# Patient Record
Sex: FEMALE | Race: WHITE
[De-identification: names, ages, dates, MRNs, and addresses within clinical notes are randomized per-mention and may not be internally consistent; named-entity substitution may affect disease eponyms.]

---

## 2018-06-22 ENCOUNTER — HOSPITAL ENCOUNTER (EMERGENCY)
Dept: HOSPITAL 68 - ERH | Age: 38
End: 2018-06-22
Payer: COMMERCIAL

## 2018-06-22 VITALS — DIASTOLIC BLOOD PRESSURE: 76 MMHG | SYSTOLIC BLOOD PRESSURE: 117 MMHG

## 2018-06-22 DIAGNOSIS — Z48.00: Primary | ICD-10-CM

## 2018-06-22 NOTE — ED ANIMAL BITE/WOUND CHECK
History of Present Illness
 
General
Chief Complaint: Suture Removal/Wound Recheck
Stated Complaint: WOUND CHECK
Source: patient
Exam Limitations: no limitations
 
Vital Signs & Intake/Output
Vital Signs & Intake/Output
 Vital Signs
 
 
Date Time Temp Pulse Resp B/P B/P Pulse O2 O2 Flow FiO2
 
     Mean Ox Delivery Rate 
 
06/22 1044 97.9 70  117/76  98   
 
 
 
Allergies
Coded Allergies:
Sulfa (Sulfonamide Antibiotics) (RASH/HIVES 10/01/16)
acetaminophen (From THERAFLU COLD-SORE THROAT (PE)) (CONVULSIONS AS A KID 10/01/
16)
clindamycin (RASH/HIVES 10/01/16)
doxycycline (RASH/HIVES 10/01/16)
hornet venom (ANAPHYLAXIS 10/01/16)
pheniramine (From THERAFLU COLD-SORE THROAT (PE)) (CONVULSIONS AS A KID 10/01/16
)
phenylephrine (From THERAFLU COLD-SORE THROAT (PE)) (CONVULSIONS AS A KID 10/01/
16)
spider venom (RASH, EYES SWELL 10/01/16)
venom-honey bee (BEE VENOM (HONEY BEE)) (ANAPHYLAXIS 10/01/16)
venom-wasp (WASP VENOM) (ANAPHYLAXIS 10/01/16)
 
Reconcile Medications
Alprazolam 1 MG TABLET   1 TAB PO BID PRN ANXIETY  (Reported)
Amoxicillin/Potassium Clav (Augmentin 875-125 Tablet) 875 MG-125 MG TABLET   1 
TAB PO BID cat bite
Baclofen 20 MG TABLET   1 TAB PO TID PRN PAIN 
Ciprofloxacin HCl (Cipro) 500 MG TABLET   1 TAB PO BID UTI
Cyclobenzaprine HCl 10 MG TABLET   1 TAB PO QPM PRN MUSCLE SPASMS  (Reported)
Epinephrine (Epipen 2-Jace) 0.3 MG/0.3 ML AUTO.INJCT   0.3 MG IM AD PRN ALLERGIC 
REACTION  (Reported)
Hydromorphone HCl (Dilaudid) 2 MG TABLET   1 TAB PO BIDP PRN pain
Hydromorphone HCl (Dilaudid) 2 MG TABLET   1 TAB PO Q6P PRN PAIN
Hydromorphone HCl (Dilaudid) 2 MG TABLET   1 TAB PO BIDP PRN PAIN
Lisinopril 10 MG TABLET   1 TAB PO DAILY BP  (Reported)
Medroxyprogesterone Acetate (Depo-Provera) 150 MG/1 ML SYRINGE   1 ML IM Q3M 
BIRTH CONTROL  (Reported)
Meloxicam (Mobic) 15 MG TABLET   1 TAB PO DAILY PRN PAIN
Ondansetron (Zofran Odt) 4 MG TAB.RAPDIS   1 TAB SL TID PRN NAUSEA 
Ondansetron (Zofran Odt) 4 MG TAB.RAPDIS   1 TAB SL TID PRN NAUSEA
Oxycodone HCl/Acetaminophen (Percocet 5-325 MG Tablet) 5 MG-325 MG TABLET   1 
TAB PO BID pain
Phenazopyridine HCl (Pyridium) 100 MG TABLET   1 TAB PO TID PRN DYSURIA
     NOTE THAT YOUR URINE MAY CHANGE THE COLOR ORANGE
 
Triage Note:
PER PT SEEN 2 DAYS AGO AFTER KNOWN CAT BIT PT
 REPORTS WAS ON DOXYCYCLINE, BUT LIPS AND NOSE WAS
 SO SWOLLEN, CAME TO ED SWITCHED TO AUGMRNTIN AND
 IT IS BETTER BUT HAS A COUPLE OF SMALL RAISED
 AREAS THAT ARE HARD HERE FRO RECHECK
Triage Nurses Notes Reviewed? yes
Pregnant: No
Patient currently breastfeeds: No
HPI:
38-year-old female presents emergency department for wound check.  She was bit 
by 4 days ago.  Initially she was seen by her primary doctor who placed her on 
doxycycline and updated her tetanus shot.  She was then seen 2 days after this 
and started on Augmentin as the swelling did not improve.  She has been taking 
Augmentin as prescribed.  States she has significant improvement in her 
symptoms.  Swelling has gone down significantly and overall redness has 
improved.  She denies any fevers or chills.  No nausea or vomiting.
 
Past History
 
Travel History
Traveled to [place] past 21 day No
 
Medical History
Any Pertinent Medical History? none
Neurological: NONE
EENT: NONE
Cardiovascular: MITRO VALVE REGURGIATION CORINARY SPASMS
Respiratory: NONE
Gastrointestinal: NONE
Hepatic: NONE
Renal: KIDNEY STONES
Musculoskeletal: NONE
Psychiatric: anxiety
Endocrine: NONE
Blood Disorders: NONE
Cancer(s): NONE
GYN/Reproductive: DEPO SHOT
 
Surgical History
Surgical History: non-contributory
 
Psychosocial History
What is your primary language English
Tobacco Use: Current Daily Use
Daily Tobacco Use Amount/Type: => 5 Cigarettes daily
 
Family History
Hx Contributory? No
 
Review of Systems
 
Review of Systems
Constitutional:
Reports: no symptoms. 
EENTM:
Reports: no symptoms. 
Respiratory:
Reports: no symptoms. 
Cardiovascular:
Reports: no symptoms. 
Skin:
Reports: see HPI. 
All Other Systems: Reviewed and Negative
 
Physical Exam
 
Physical Exam
General Appearance: well developed/nourished, no apparent distress
Head: atraumatic, normal appearance
Eyes:
Bilateral: normal appearance, PERRL, EOMI. 
Ears, Nose, Throat: normal pharynx, normal ENT inspection
Neck: normal inspection, supple, No lymphadenopathy
Respiratory: normal breath sounds, no respiratory distress
Cardiovascular: regular rate/rhythm
Skin: Well healing bite wounds upper and lower lips. No surrounding cellulitis, 
no localized swelling, no discharge or palpable abscess. 
 
Progress
Differential Diagnosis: abscess, cellulitis, joint infection, tenosysnovitis
Plan of Care:
38-year-old female who presented for a wound check from a Bite 4 days ago.  She 
has been taking Augmentin as prescribed.  Her tetanus shot was updated.  She is 
feeling much better.  There is been significant improvement in her rash.  
Swelling is overall decreased.  There is no discharge or abscess.  No cellulitis
on exam.  She is afebrile and well-appearing.  I will have her continue to take 
Augmentin as prescribed.  She should follow-up with her doctor in 2-3 days for 
another wound check.  I encouraged her to return immediately to the emergency 
department with any new or worsening symptoms including fever worsening redness/
swelling.
 
Departure
 
Departure
Disposition: HOME OR SELF CARE
Condition: Stable
Clinical Impression
Primary Impression: Encounter for wound re-check
Referrals:
Corrie Tamayo MD (PCP/Family)
 
Additional Instructions:
Keep taking antibiotics as prescribed.  You should follow-up with your primary 
doctor in 2-3 days for wound check.  Return to emergency department immediately 
with any new or worsening symptoms including worsening redness or fever.
Departure Forms:
Customer Survey
General Discharge Information